# Patient Record
Sex: MALE | Race: BLACK OR AFRICAN AMERICAN | Employment: UNEMPLOYED | URBAN - METROPOLITAN AREA
[De-identification: names, ages, dates, MRNs, and addresses within clinical notes are randomized per-mention and may not be internally consistent; named-entity substitution may affect disease eponyms.]

---

## 2023-06-16 ENCOUNTER — HOSPITAL ENCOUNTER (EMERGENCY)
Facility: HOSPITAL | Age: 18
Discharge: HOME | End: 2023-06-16
Attending: EMERGENCY MEDICINE
Payer: COMMERCIAL

## 2023-06-16 VITALS
TEMPERATURE: 100 F | BODY MASS INDEX: 18.94 KG/M2 | RESPIRATION RATE: 16 BRPM | HEART RATE: 80 BPM | HEIGHT: 68 IN | OXYGEN SATURATION: 99 % | SYSTOLIC BLOOD PRESSURE: 119 MMHG | WEIGHT: 125 LBS | DIASTOLIC BLOOD PRESSURE: 70 MMHG

## 2023-06-16 DIAGNOSIS — J06.9 VIRAL UPPER RESPIRATORY ILLNESS: Primary | ICD-10-CM

## 2023-06-16 DIAGNOSIS — R52 GENERALIZED BODY ACHES: ICD-10-CM

## 2023-06-16 PROCEDURE — 6370000000 HC RX 637 (ALT 250 FOR IP): Performed by: EMERGENCY MEDICINE

## 2023-06-16 PROCEDURE — 99283 EMERGENCY DEPT VISIT LOW MDM: CPT

## 2023-06-16 RX ORDER — IBUPROFEN 600 MG/1
600 TABLET ORAL
Status: COMPLETED | OUTPATIENT
Start: 2023-06-16 | End: 2023-06-16

## 2023-06-16 RX ADMIN — IBUPROFEN 600 MG: 600 TABLET, FILM COATED ORAL at 15:09

## 2023-06-16 ASSESSMENT — ENCOUNTER SYMPTOMS
COLOR CHANGE: 0
NAUSEA: 0
BACK PAIN: 0
DIARRHEA: 0
ABDOMINAL PAIN: 0
COUGH: 1
SORE THROAT: 1
SHORTNESS OF BREATH: 0

## 2023-06-16 ASSESSMENT — PAIN SCALES - GENERAL: PAINLEVEL_OUTOF10: 6

## 2023-06-16 ASSESSMENT — LIFESTYLE VARIABLES
HOW MANY STANDARD DRINKS CONTAINING ALCOHOL DO YOU HAVE ON A TYPICAL DAY: PATIENT DOES NOT DRINK
HOW OFTEN DO YOU HAVE A DRINK CONTAINING ALCOHOL: NEVER

## 2023-06-16 ASSESSMENT — PAIN DESCRIPTION - LOCATION: LOCATION: GENERALIZED

## 2023-06-16 NOTE — ED PROVIDER NOTES
[facility] EMERGENCY DEPT  EMERGENCY DEPARTMENT ENCOUNTER      Pt Name: Charu Walker  MRN: [id number]  [place] 2005  Date of evaluation: 6/16/2023  Provider: Flynn Elkins [place]       Chief Complaint   Patient presents with    Fatigue    Generalized Body Aches         HISTORY OF PRESENT ILLNESS   (Location/Symptom, Timing/Onset, Context/Setting, Quality, Duration, Modifying Factors, Severity)  Note limiting factors. Charu Walker is a 19 y.o. male with past medical history as listed below who presents to the emergency department for evaluation of several days of cough, nasal congestion, generalized body aches, fatigue, chills. Is been taking over-the-counter Tylenol with temporary relief. Reports 2 days ago he had several episodes of vomiting which has not returned. Coughing up mucus. He denies fever, neck stiffness, chest pain, shortness of breath, abdominal pain, diarrhea, urinary changes, or any additional medical complaints at this time. Nursing Notes were reviewed. REVIEW OF SYSTEMS    (2-9 systems for level 4, 10 or more for level 5)     Review of Systems   Constitutional:  Negative for fever. HENT:  Positive for congestion and sore throat. Eyes:  Negative for visual disturbance. Respiratory:  Positive for cough. Negative for shortness of breath. Cardiovascular:  Negative for chest pain. Gastrointestinal:  Negative for abdominal pain, diarrhea and nausea. Genitourinary:  Negative for dysuria. Musculoskeletal:  Positive for myalgias. Negative for back pain and neck pain. Skin:  Negative for color change. Neurological:  Negative for dizziness and headaches. Psychiatric/Behavioral:  Negative for confusion. Except as noted above the remainder of the review of systems was reviewed and negative. PAST MEDICAL HISTORY   History reviewed. No pertinent past medical history. SURGICAL HISTORY     History reviewed.  No pertinent

## 2023-06-16 NOTE — DISCHARGE INSTRUCTIONS
You were seen today for upper respiratory virus symptoms. Your symptoms appear to be due to a viral illness. If imaging and lab work were done, these are reassuring enough to go home at this time. Viral illnesses are treated with supportive care, including increasing your fluid intake to 8-10 large glasses of water a day, over the counter fever and pain reducers, and rest. You can alternate ibuprofen/tylenol for aches and fever, and try a decongestant to help with the mucous (Dayquil, nyquil, mucinex). Take all other medications as prescribed and directed. If you smoke/ vape, please cut back on usage and try to stop as these can lengthen the time of your symptoms and possibly worsen them as well. To limit the spread of your symptoms to others you should wash your hands frequently and keep surfaces in your home clean. You condition should improve over the next 5 days with the care discussed. You should return to the ER- if you experience increased fevers that do not improve with use of Tylenol and Motrin (as directed),  increased shortness of breath, chest pain, changes in vision such as blurry vision, neck stiffness, confusion, or other worsening or worrisome concerns. You should follow up with your primary care physician this week for further evaluation. Call for an appointment today.

## 2025-04-29 ENCOUNTER — OFFICE VISIT (OUTPATIENT)
Age: 20
End: 2025-04-29

## 2025-04-29 VITALS
DIASTOLIC BLOOD PRESSURE: 66 MMHG | SYSTOLIC BLOOD PRESSURE: 106 MMHG | BODY MASS INDEX: 19.16 KG/M2 | RESPIRATION RATE: 16 BRPM | TEMPERATURE: 98.2 F | OXYGEN SATURATION: 99 % | HEART RATE: 60 BPM | WEIGHT: 126 LBS

## 2025-04-29 DIAGNOSIS — R35.0 URINARY FREQUENCY: Primary | ICD-10-CM

## 2025-04-29 LAB
BILIRUBIN, URINE, POC: NEGATIVE
BLOOD URINE, POC: NEGATIVE
GLUCOSE URINE, POC: NEGATIVE
KETONES, URINE, POC: NEGATIVE
LEUKOCYTE ESTERASE, URINE, POC: NEGATIVE
NITRITE, URINE, POC: NEGATIVE
PH, URINE, POC: 6 (ref 4.6–8)
PROTEIN,URINE, POC: NEGATIVE
SPECIFIC GRAVITY, URINE, POC: 1.03 (ref 1–1.03)
URINALYSIS CLARITY, POC: CLEAR
URINALYSIS COLOR, POC: NORMAL
UROBILINOGEN, POC: NORMAL

## 2025-04-29 RX ORDER — METHYLPHENIDATE HYDROCHLORIDE 27 MG/1
27 TABLET, EXTENDED RELEASE ORAL
COMMUNITY

## 2025-04-29 NOTE — PATIENT INSTRUCTIONS
Exam and history consistent with urinary frequency.   -Urinalysis negative for signs of a UTI   -Avoid drinking at least an hour before bed time   -Please drink at least 64 ounces of fluid a day   -Please follow up with your PCP in the next 2-4 weeks     If symptoms persist or worsen, please contact your PCP and/or return to Urgent Care.

## 2025-04-29 NOTE — PROGRESS NOTES
2025   Elvin Benson (: 2005) is a 20 y.o. male, New patient, here for evaluation of the following chief complaint(s):  Urinary Tract Infection (Possible UTI , Urinary frequency x 3 days  )     ASSESSMENT/PLAN:  Below is the assessment and plan developed based on review of pertinent history, physical exam, labs, studies, and medications.  Assessment & Plan  Urinary frequency       Orders:    AMB POC URINALYSIS DIP STICK AUTO W/O MICRO  Exam and history consistent with urinary frequency.   -Urinalysis negative for signs of a UTI   -Avoid drinking at least an hour before bed time   -Please drink at least 64 ounces of fluid a day   -Please follow up with your PCP in the next 2-4 weeks     If symptoms persist or worsen, please contact your PCP and/or return to Urgent Care.         Handout given with care instructions  2. OTC for symptom management. Increase fluid intake, ensure adequate nutritional intake.  3. Follow up with PCP as needed.  4. Go to ED with development of any acute symptoms.     Follow up:  No follow-ups on file.  Follow up immediately for any new, worsening or changes or if symptoms are not improving over the next 5-7 days.     SUBJECTIVE/OBJECTIVE:    Urinary Tract Infection       Mr Benson presents with concern for urinary frequency for the past couple of days. He denies any burning or discharge, and states that he was just nervous because he had a brother who had a UTI before. Otherwise, he denies any headache, fever, chills, shortness of breath, chest pain, or other systemic complaints or concerns at this time.     Urinary Tract Infection (Possible UTI , Urinary frequency x 3 days  )      No results found for any visits on 25.    No results found for this visit on 25.    Review of Systems    ROS reviewed and negative except as stated in the HPI   Physical Exam  Vitals reviewed.   Constitutional:       General: He is not in acute distress.     Appearance: Normal